# Patient Record
Sex: MALE | Race: WHITE | URBAN - METROPOLITAN AREA
[De-identification: names, ages, dates, MRNs, and addresses within clinical notes are randomized per-mention and may not be internally consistent; named-entity substitution may affect disease eponyms.]

---

## 2019-09-17 ENCOUNTER — HOSPITAL ENCOUNTER (OUTPATIENT)
Dept: PHYSICAL THERAPY | Age: 39
Discharge: HOME | End: 2019-09-17
Payer: SELF-PAY

## 2019-09-17 PROCEDURE — 97161 PT EVAL LOW COMPLEX 20 MIN: CPT

## 2019-09-17 NOTE — THERAPY EVALUATION
Ekaterina Sommers  : 1980  Primary: Lukas Hayes Self Pay  Secondary:  [address]  at [address]  [phone]   [phone]          OUTPATIENT PHYSICAL THERAPY:Initial Assessment 2019   ICD-10: Treatment Diagnosis: Pain in joint, left elbow, M25.522  Stiffness in joint, not elsewhere classified, left elbow, M25.622  Precautions/Allergies:   Patient has no allergy information on record. TREATMENT PLAN:  Effective Dates: 2019 TO 2019 (90 days). Frequency/Duration: 2 times a week for 2 weeks then one time a week for 10 weeks MEDICAL/REFERRING DIAGNOSIS:  Left elbow pain [M25.522]   DATE OF ONSET: Surgery 19, injury on 19  REFERRING PHYSICIAN: Abbey Núñez,*  MD Orders: Evaluate and Treat  Return MD Appointment: 10/15/19     INITIAL ASSESSMENT:  Mr. Kenny Garduno presents s/p left triceps tendon repair at elbow with surgery on 19. Patient shows good healing at this time with his incision. He shows limitations per tendon healing and ROM restrictions for healing. He shows muscle atrophy as well limiting his functional abilities. He is a good candidate for skilled PT in order to address listed impairments affecting his function. Gaurav Billy, PT, DPT, OCS      PROBLEM LIST (Impacting functional limitations):  1. Decreased Strength  2. Decreased ADL/Functional Activities  3. Increased Pain  4. Decreased Activity Tolerance  5. Decreased Flexibility/Joint Mobility  6. Edema/Girth INTERVENTIONS PLANNED: (Treatment may consist of any combination of the following)  1. Decongestion Therapy  2. Electrical Stimulation  3. Manual Therapy  4. Neuromuscular Re-education/Strengthening  5. Range of Motion (ROM)  6. Therapeutic Activites  7. Therapeutic Exercise/Strengthening     GOALS: (Goals have been discussed and agreed upon with patient.)  Short-Term Functional Goals: Time Frame: 4 weeks  1.  Patient will show a greater than 10 degree increase in left elbow flexion in order to return to function  2. Patient will show full elbow extension in order to progress function  3. Patient will be independent in ROM activities for his left elbow, shoulder and wrist  Discharge Goals: Time Frame: 12 weeks  1. Patient will report returning to weight training at least 4 days a week with light weight and no pain  2. Patient will show a greater than 15 point decrease on the DASH in order to show an increase in function  3. Patient will report lifting his children up with both arms without pain  4. Patient will be independent in all strengthening activities    OUTCOME MEASURE:   Tool Used: Disabilities of the Arm, Shoulder and Hand (DASH) Questionnaire - Quick Version  Score:  Initial: 31/55  Most Recent: X/55 (Date: -- )   Interpretation of Score: The DASH is designed to measure the activities of daily living in person's with upper extremity dysfunction or pain. Each section is scored on a 1-5 scale, 5 representing the greatest disability. The scores of each section are added together for a total score of 55. MEDICAL NECESSITY:   · Patient is expected to demonstrate progress in strength, range of motion, coordination and functional technique to improve functional mobility. · Patient demonstrates excellent rehab potential due to higher previous functional level. · Skilled intervention continues to be required due to increased stiffness and tissue healing. REASON FOR SERVICES/OTHER COMMENTS:  · Patient continues to require skilled intervention due to decreased function. Total Duration:  PT Patient Time In/Time Out  Time In: 1100  Time Out: 1158    Rehabilitation Potential For Stated Goals: Excellent  Regarding Albert Perez therapy, I certify that the treatment plan above will be carried out by a therapist or under their direction.   Thank you for this referral,  Temo Dhaliwal PT     Referring Physician Signature: Seldon Bamberger Martha Cool,* No Signature is Required for this note. PAIN/SUBJECTIVE:   Initial: Pain Intensity 1: 1  Post Session:  0/10   HISTORY:   History of Injury/Illness (Reason for Referral):  Patient reports he was lifting weights for his workout of cleans and felt a pop in his left elbow. He had increased bruising and swelling at that time, but elected to see what recovery would do if he just gave it time. He gained a lot of function back, just not the strength. He elected to have a tendon repair surgery on 9/6/19 for the long head of the triceps in the left arm. Incision at posterior elbow. His goals are to get back to normal function and his lifting for exercise. 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 Past Medical History/Comorbidities:   Mr. Krissy Escamilla  has a past medical history of Heart abnormalities. He also has no past medical history of Arthritis, Asthma, Autoimmune disease ([address] Cancer ([address] Chronic kidney disease, Chronic pain, COPD, Diabetes ([address] GERD (gastroesophageal reflux disease), Hypertension, Liver disease, Musculoskeletal disorder, Neurological disorder, Osteoporosis, Other ill-defined conditions, Psychiatric disorder, PUD (peptic ulcer disease), Seizures ([address] Stroke ([address] Thromboembolus ([address] or Thyroid disease. Mr. Krissy Escamilla  has no past surgical history on file.   Social History/Living Environment:       Social History     Socioeconomic History    Marital status:      Spouse name: Not on file    Number of children: Not on file    Years of education: Not on file    Highest education level: Not on file   Occupational History    Not on file   Social Needs    Financial resource strain: Not on file    Food insecurity:     Worry: Not on file     Inability: Not on file    Transportation needs:     Medical: Not on file     Non-medical: Not on file   Tobacco Use    Smoking status: Not on file   Substance and Sexual Activity    Alcohol use: Not on file    Drug use: Not on file    Sexual activity: Not on file   Lifestyle    Physical activity:     Days per week: Not on file     Minutes per session: Not on file    Stress: Not on file   Relationships    Social connections:     Talks on phone: Not on file     Gets together: Not on file     Attends Anabaptist service: Not on file     Active member of club or organization: Not on file     Attends meetings of clubs or organizations: Not on file     Relationship status: Not on file    Intimate partner violence:     Fear of current or ex partner: Not on file     Emotionally abused: Not on file     Physically abused: Not on file     Forced sexual activity: Not on file   Other Topics Concern    Caffeine Concern Not Asked    Back Care Not Asked    Exercise Not Asked    Occupational Exposure Not Asked    Sleep Concern Not Asked    Stress Concern Not Asked    Weight Concern Not Asked   Social History Narrative    Not on file       Prior Level of Function/Work/Activity:  Independent, gym   Dominant Side:         RIGHT   Ambulatory/Rehab Services H2 Model Falls Risk Assessment   Risk Factors:       (1)  Gender [Male] Ability to Rise from Chair:       (0)  Ability to rise in a single movement   Falls Prevention Plan:       No modifications necessary   Total: (5 or greater = High Risk): 1   ©2010 [facility] [address] States Patent #[id number]. Federal Law prohibits the replication, distribution or use without written permission from [facility] [employer]   Current Medications: over the counter as needed for pain    No current outpatient medications on file. Date Last Reviewed:  9/17/2019     Number of Personal Factors/Comorbidities that affect the Plan of Care: 0: LOW COMPLEXITY   EXAMINATION:   Observation/Orthostatic Postural Assessment:          Patient shows good healing in the scar with steri strips still intact.   Some swelling noted on posterior left elbow  Girth: Right elbow: 34 cm (10 cm prox to joint line), 29 cm (joint line), 28 cm (10 cm distal to joint line)  Left elbow:32 cm (10 cm prox to joint line), 30 cm (joint line), 29 cm (10 cm distal to joint line)  Noted atrophy at biceps and triceps bulk  Palpation:          Tightness in left triceps tendons and muscle belly  Edema and warmth around olecranon process  Restricted at full extension with swelling and tracking of ulna  Radius shows some restriction with supination  Tendon tension limited with elbow flexion due to surgical healing  ROM:           L Elbow: missing 2 deg from full extension to 117 deg of flexion at first barrier  R Elbow: 0-132 deg  Strength:          Limitations per surgery at this time. Body Structures Involved:  1. Bones  2. Joints  3. Muscles  4. Ligaments Body Functions Affected:  1. Neuromusculoskeletal  2. Movement Related Activities and Participation Affected:  1. General Tasks and Demands  2. Mobility  3. Domestic Life  4.  Community, Social and [place] [employer]   Number of elements (examined above) that affect the Plan of Care: 4+: HIGH COMPLEXITY   CLINICAL PRESENTATION:   Presentation: Stable and uncomplicated: LOW COMPLEXITY   CLINICAL DECISION MAKING:   Use of outcome tool(s) and clinical judgement create a POC that gives a: Clear prediction of patient's progress: LOW COMPLEXITY

## 2019-09-17 NOTE — PROGRESS NOTES
Savanah Call  : 1980  Primary: Carmen Simple Self Pay  Secondary:  [address]  at [address]  [phone]   [phone]        OUTPATIENT PHYSICAL THERAPY: Daily Treatment Note 2019  Visit Count:  1    ICD-10: Treatment Diagnosis: Pain in joint, left elbow, M25.522  Stiffness in joint, not elsewhere classified, left elbow, M25.622  Precautions/Allergies:   Patient has no allergy information on record. TREATMENT PLAN:  Effective Dates: 2019 TO 2019 (90 days). Frequency/Duration: 2 times a week for 2 weeks then one time a week for 10 weeks    Pre-treatment Symptoms/Complaints:  Patient reports no real pain since surgery and he is taking it really easy  Pain: Initial: Pain Intensity 1: 1 /10 Post Session:  0/10   Medications Last Reviewed:  2019  Updated Objective Findings:  See evaluation note from today  TREATMENT:     MANUAL THERAPY: (0 minutes): Joint mobilization and Soft tissue mobilization was utilized and necessary because of the patient's restricted joint motion and restricted motion of soft tissue. [employer] Portal  Treatment/Session Summary:    · Response to Treatment:  Patient understands HEP and POC at this time. · Communication/Consultation:  None today  · Equipment provided today:  None today  · Recommendations/Intent for next treatment session: Next visit will focus on ROM.     Total Treatment Billable Duration:  0 minutes  PT Patient Time In/Time Out  Time In: 1100  Time Out: [address] Ter[address] Ramos, PT    Future Appointments   Date Time Provider Dot Simmons   2019 11:00 AM Garry Spears, PT [facility]   2019 11:00 AM Dorothy Jimenes, PT [facility]   2019 11:00 AM Dorothy Jimenes, PT [facility]   10/1/2019 11:00 AM Dorothy Jimenes, PT [facility]   10/3/2019 11:00 AM Dorothy Jimenes, PT [facility]   10/8/2019 11:00 AM Dorothy Jimenes, PT [facility]   10/10/2019 11:00 AM [facility]

## 2019-09-19 ENCOUNTER — HOSPITAL ENCOUNTER (OUTPATIENT)
Dept: PHYSICAL THERAPY | Age: 39
Discharge: HOME | End: 2019-09-19
Payer: SELF-PAY

## 2019-09-19 PROCEDURE — 97140 MANUAL THERAPY 1/> REGIONS: CPT

## 2019-09-19 PROCEDURE — 97110 THERAPEUTIC EXERCISES: CPT

## 2019-09-19 NOTE — PROGRESS NOTES
Hemal Padilla  : 1980  Primary: Lisbeth Camacho Self Pay  Secondary:  [address]  at [address]  [phone]   [phone]        OUTPATIENT PHYSICAL THERAPY: Daily Treatment Note 2019  Visit Count:  2    ICD-10: Treatment Diagnosis: Pain in joint, left elbow, M25.522  Stiffness in joint, not elsewhere classified, left elbow, M25.622  Precautions/Allergies:   Patient has no allergy information on record. TREATMENT PLAN:  Effective Dates: 2019 TO 2019 (90 days). Frequency/Duration: 2 times a week for 2 weeks then one time a week for 10 weeks    Pre-treatment Symptoms/Complaints:  Patient reports a little stiff after working the ROM, but not bad. He is trying to not do too much  With it still  Pain: Initial: Pain Intensity 1: 1 /10 Post Session:  0/10   Medications Last Reviewed:  2019  Updated Objective Findings:  See evaluation note from today  TREATMENT:     THERAPEUTIC EXERCISE: (15 minutes):  Exercises per grid below to improve mobility, strength and coordination. Required minimal visual, verbal and manual cues to promote proper body alignment, promote proper body posture and promote proper body mechanics. Progressed resistance and range as indicated.    Date:  2019     Activity/Exercise Parameters   ROM Passive x 10 each for flexion and extension  Wrist supination and pronation x 15 each   Scar tissue mobilization With dysom perpendicular to the scar x 5 min with training   Isometric Elbow in neutral with gentle activation x 5 min                       MANUAL THERAPY: (30 minutes): Joint mobilization and Soft tissue mobilization was utilized and necessary because of the patient's restricted joint motion and restricted motion of soft tissue.   -Supine soft tissue work around the left elbow, along triceps tendons and along bony contours  -Gentle mobilization of olecranon into fossa for extension  Scar tissue mobility   -Elbow flexion and extension with pronation and supination of the wrist  [employer] Portal  Treatment/Session Summary:    · Response to Treatment: Patient shows some improvement in mobility. Still warm to touch and instructed to ice after his workout  · Communication/Consultation:  None today  · Equipment provided today:  None today  · Recommendations/Intent for next treatment session: Next visit will focus on ROM.     Total Treatment Billable Duration:  45 minutes  PT Patient Time In/Time Out  Time In: 1100  Time Out: [address], PT    Future Appointments   Date Time Provider Dot Simmons   9/24/2019 11:00 AM Mary Marquez, PT [facility]   9/27/2019 11:00 AM SusansEugeniaYana Manual, PT [facility]   10/1/2019 11:00 AM SusansEugeniaYana Manual, PT [facility]   10/3/2019 11:00 AM Susans Yana Manual, PT [facility]   10/8/2019 11:00 AM Susans Yana Manual, PT [facility]   10/10/2019 11:00 AM Susans Yana Manual, PT [facility]

## 2019-09-24 ENCOUNTER — HOSPITAL ENCOUNTER (OUTPATIENT)
Dept: PHYSICAL THERAPY | Age: 39
Discharge: HOME | End: 2019-09-24
Payer: SELF-PAY

## 2019-09-24 PROCEDURE — 97140 MANUAL THERAPY 1/> REGIONS: CPT

## 2019-09-24 PROCEDURE — 97110 THERAPEUTIC EXERCISES: CPT

## 2019-09-24 NOTE — PROGRESS NOTES
Keshav Green  : 1980  Primary: Bruce Pearson Self Pay  Secondary:  [address]  at [address]  [phone]   [phone]        OUTPATIENT PHYSICAL THERAPY: Daily Treatment Note 2019  Visit Count:  3    ICD-10: Treatment Diagnosis: Pain in joint, left elbow, M25.522  Stiffness in joint, not elsewhere classified, left elbow, M25.622  Precautions/Allergies:   Patient has no allergy information on record. TREATMENT PLAN:  Effective Dates: 2019 TO 2019 (90 days). Frequency/Duration: 2 times a week for 2 weeks then one time a week for 10 weeks    Pre-treatment Symptoms/Complaints:  Patient reports a little less soreness today  Pain: Initial: Pain Intensity 1: 1 /10 Post Session:  0/10   Medications Last Reviewed:  2019  Updated Objective Findings:  See evaluation note from today  TREATMENT:     THERAPEUTIC EXERCISE: (10 minutes):  Exercises per grid below to improve mobility, strength and coordination. Required minimal visual, verbal and manual cues to promote proper body alignment, promote proper body posture and promote proper body mechanics. Progressed resistance and range as indicated.    Date:  2019     Activity/Exercise Parameters   ROM Passive x 10 each for flexion and extension  Wrist supination and pronation x 15 each   Scar tissue mobilization With dysom perpendicular to the scar x 5 min with training   Isometric Elbow in neutral with gentle activation x 5 min                       MANUAL THERAPY: (30 minutes): Joint mobilization and Soft tissue mobilization was utilized and necessary because of the patient's restricted joint motion and restricted motion of soft tissue.   -Supine soft tissue work around the left elbow, along triceps tendons and along bony contours  -Gentle mobilization of olecranon into fossa for extension  Scar tissue mobility   -Elbow flexion and extension with pronation and supination of the wrist  [employer] Portal  Treatment/Session Summary:    · Response to Treatment: Patient shows some improvement in mobility. Continue to progress isometrics and motion  · Communication/Consultation:  None today  · Equipment provided today:  None today  · Recommendations/Intent for next treatment session: Next visit will focus on ROM.     Total Treatment Billable Duration:  40 minutes  PT Patient Time In/Time Out  Time In: 1104  Time Out: [address], PT    Future Appointments   Date Time Provider Dot Simmons   9/27/2019 11:00 AM Fabi Camejo, PT [facility]   10/1/2019 11:00 AM Christopher Jimenes, PT [facility]   10/8/2019 11:00 AM Christopher Jimenes, PT [facility]   10/17/2019 11:00 AM Christopher Jimenes, PT [facility]   10/22/2019 10:00 AM Christopher Jimenes, PT [facility]   10/29/2019 11:00 AM Christopher Jimenes, PT [facility]

## 2019-09-27 ENCOUNTER — HOSPITAL ENCOUNTER (OUTPATIENT)
Dept: PHYSICAL THERAPY | Age: 39
Discharge: HOME | End: 2019-09-27
Payer: SELF-PAY

## 2019-09-27 PROCEDURE — 97110 THERAPEUTIC EXERCISES: CPT

## 2019-09-27 PROCEDURE — 97140 MANUAL THERAPY 1/> REGIONS: CPT

## 2019-09-27 NOTE — PROGRESS NOTES
Zulay Cormier  : 1980  Primary: Ronda Lucas Self Pay  Secondary:  [address]  at [facility] [address]  [phone]   [phone]        OUTPATIENT PHYSICAL THERAPY: Daily Treatment Note 2019  Visit Count:  4    ICD-10: Treatment Diagnosis: Pain in joint, left elbow, M25.522  Stiffness in joint, not elsewhere classified, left elbow, M25.622  Precautions/Allergies:   Patient has no allergy information on record. TREATMENT PLAN:  Effective Dates: 2019 TO 2019 (90 days). 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 He reports no soreness or pain with it  Pain: Initial: Pain Intensity 1: 1 /10 Post Session:  0/10   Medications Last Reviewed:  2019  Updated Objective Findings:  See evaluation note from today  TREATMENT:     THERAPEUTIC EXERCISE: (10 minutes):  Exercises per grid below to improve mobility, strength and coordination. Required minimal visual, verbal and manual cues to promote proper body alignment, promote proper body posture and promote proper body mechanics. Progressed resistance and range as indicated.    Date:  2019     Activity/Exercise Parameters   ROM Passive x 10 each for flexion and extension  Wrist supination and pronation x 15 each   Scar tissue mobilization With dysom perpendicular to the scar x 5 min with training   Isometric Elbow in neutral with gentle activation x 5 min   Band exercises Yellow for B ER x 30  Red band for rows with no pain x 30   Wall slide With towel x 20 for forward elevation with no pain               MANUAL THERAPY: (30 minutes): Joint mobilization and Soft tissue mobilization was utilized and necessary because of the patient's restricted joint motion and restricted motion of soft tissue.   -Supine soft tissue work around the left elbow, along triceps tendons and along bony contours  -Gentle mobilization of olecranon into fossa for extension  Scar tissue mobility   -Elbow flexion and extension with pronation and supination of the wrist  [employer] Portal  Treatment/Session Summary:    · Response to Treatment: Patient shows some improvement in mobility. Work on shoulder mobility into flexion/  · Communication/Consultation:  None today  · Equipment provided today:  None today  · Recommendations/Intent for next treatment session: Next visit will focus on ROM.     Total Treatment Billable Duration:  40 minutes  PT Patient Time In/Time Out  Time In: 1102  Time Out: [facility] 951, PT    Future Appointments   Date Time Provider Dot Simmons   10/1/2019 11:00 AM Moshe Acharya, PT [facility]   10/8/2019 11:00 AM Kaity Jimenes, PT [facility]   10/17/2019 11:00 AM Kaity Jimenes, PT [facility]   10/22/2019 10:00 AM Kaity Jimenes, PT [facility]   10/29/2019 11:00 AM Kaity Jimenes, PT [facility]

## 2019-10-01 ENCOUNTER — HOSPITAL ENCOUNTER (OUTPATIENT)
Dept: PHYSICAL THERAPY | Age: 39
Discharge: HOME | End: 2019-10-01
Payer: SELF-PAY

## 2019-10-01 PROCEDURE — 97110 THERAPEUTIC EXERCISES: CPT

## 2019-10-01 PROCEDURE — 97140 MANUAL THERAPY 1/> REGIONS: CPT

## 2019-10-01 NOTE — PROGRESS NOTES
Aydin Gomez  : 1980  Primary:   Secondary:  [address] at [address]  [phone]   [phone]        OUTPATIENT PHYSICAL THERAPY: Daily Treatment Note 10/1/2019  Visit Count:  5    ICD-10: Treatment Diagnosis: Pain in joint, left elbow, M25.522  Stiffness in joint, not elsewhere classified, left elbow, M25.622  Precautions/Allergies:   Patient has no allergy information on record. TREATMENT PLAN:  Effective Dates: 2019 TO 2019 (90 days). Frequency/Duration: 2 times a week for 2 weeks then one time a week for 10 weeks    Pre-treatment Symptoms/Complaints:  Patient reports having a little irritation in the elbow after catching a drink from falling over the weekend, but feels ok today  Pain: Initial: Pain Intensity 1: 2 /10 Post Session:  0/10   Medications Last Reviewed:  10/1/2019  Updated Objective Findings:  See evaluation note from today  TREATMENT:     THERAPEUTIC EXERCISE: (14 minutes):  Exercises per grid below to improve mobility, strength and coordination. Required minimal visual, verbal and manual cues to promote proper body alignment, promote proper body posture and promote proper body mechanics. Progressed resistance and range as indicated.    Date:  10/1/2019     Activity/Exercise Parameters   ROM Passive x 10 each for flexion and extension  Wrist supination and pronation x 15 each   Scar tissue mobilization With dysom perpendicular to the scar x 5 min with training   Isometric Elbow in neutral with gentle activation x 5 min   Band exercises Yellow for B ER x 30  Red band for rows with no pain x 30   Wall slide With towel x 20 for forward elevation with no pain   Pulley's X 4 min           MANUAL THERAPY: (30 minutes): Joint mobilization and Soft tissue mobilization was utilized and necessary because of the patient's restricted joint motion and restricted motion of soft tissue.   -Supine soft tissue work around the left elbow, along triceps tendons and along bony contours  -Gentle mobilization of olecranon into fossa for extension  Scar tissue mobility   -Elbow flexion and extension with pronation and supination of the wrist  [employer] Portal  Treatment/Session Summary:    · Response to Treatment: Patient shows some improvement in mobility. Started pulley's for overall L UE mobility today  · Communication/Consultation:  None today  · Equipment provided today:  None today  · Recommendations/Intent for next treatment session: Next visit will focus on ROM.     Total Treatment Billable Duration:  44 minutes  PT Patient Time In/Time Out  Time In: 1101  Time Out: [address], PT    Future Appointments   Date Time Provider Dot Simmons   10/8/2019 11:00 AM Kathrene Simmonds, PT [facility]   10/17/2019 11:00 AM Ryanne Jimenes, PT [facility]   10/22/2019 10:00 AM Ryanne Jimenes, PT [facility]   10/29/2019 11:00 AM Ryanne Jimenes, PT [facility]

## 2019-10-03 ENCOUNTER — APPOINTMENT (OUTPATIENT)
Dept: PHYSICAL THERAPY | Age: 39
End: 2019-10-03
Payer: SELF-PAY

## 2019-10-08 ENCOUNTER — HOSPITAL ENCOUNTER (OUTPATIENT)
Dept: PHYSICAL THERAPY | Age: 39
Discharge: HOME | End: 2019-10-08
Payer: SELF-PAY

## 2019-10-08 PROCEDURE — 97140 MANUAL THERAPY 1/> REGIONS: CPT

## 2019-10-08 PROCEDURE — 97110 THERAPEUTIC EXERCISES: CPT

## 2019-10-08 NOTE — PROGRESS NOTES
America Hansons  : 1980  Primary:   Secondary:  [address] at [address]  [phone]   [phone]        OUTPATIENT PHYSICAL THERAPY: Daily Treatment Note 10/8/2019  Visit Count:  6    ICD-10: Treatment Diagnosis: Pain in joint, left elbow, M25.522  Stiffness in joint, not elsewhere classified, left elbow, M25.622  Precautions/Allergies:   Patient has no allergy information on record. TREATMENT PLAN:  Effective Dates: 2019 TO 2019 (90 days). Frequency/Duration: 2 times a week for 2 weeks then one time a week for 10 weeks    Pre-treatment Symptoms/Complaints:  Patient reports starting to move it better and less tension. He goes to the doctor for his 6 week check in next week  Pain: Initial: Pain Intensity 1: 1 /10 Post Session:  0/10   Medications Last Reviewed:  10/8/2019  Updated Objective Findings: none today  TREATMENT:     THERAPEUTIC EXERCISE: (10 minutes):  Exercises per grid below to improve mobility, strength and coordination. Required minimal visual, verbal and manual cues to promote proper body alignment, promote proper body posture and promote proper body mechanics. Progressed resistance and range as indicated.    Date:  10/8/2019     Activity/Exercise Parameters   ROM Passive x 10 each for flexion and extension  Wrist supination and pronation x 15 each   Scar tissue mobilization With dysom perpendicular to the scar x 5 min with training   Isometric Elbow in neutral with gentle activation x 1 min   Band exercises Yellow for B ER x 30  Red band for rows with no pain x 30   Wall slide With towel x 20 for forward elevation with no pain  Circles CCW/CW x 20 each   Rina's home           MANUAL THERAPY: (30 minutes): Joint mobilization and Soft tissue mobilization was utilized and necessary because of the patient's restricted joint motion and restricted motion of soft tissue.   -Supine soft tissue work around the left elbow, along triceps tendons and along bony contours  -Gentle mobilization of olecranon into fossa for extension  Scar tissue mobility   -Elbow flexion and extension with pronation and supination of the wrist  Shoulder flexion with elbow extension  MedBridge Portal  Treatment/Session Summary:    · Response to Treatment: Patient shows some improvement in mobility and decreased swelling today  · Communication/Consultation:  None today  · Equipment provided today:  None today  · Recommendations/Intent for next treatment session: Next visit will focus on ROM.     Total Treatment Billable Duration:  40 minutes  PT Patient Time In/Time Out  Time In: 1107  Time Out: [facility]  [address], PT    Future Appointments   Date Time Provider Dot Simmons   10/17/2019 11:00 AM Presley Alegria, PT [facility]   10/22/2019 10:00 AM Bella Jimenes, PT [facility]   10/29/2019 11:00 AM Bella Jimenes, PT [facility]

## 2019-10-10 ENCOUNTER — APPOINTMENT (OUTPATIENT)
Dept: PHYSICAL THERAPY | Age: 39
End: 2019-10-10
Payer: SELF-PAY

## 2019-10-17 ENCOUNTER — HOSPITAL ENCOUNTER (OUTPATIENT)
Dept: PHYSICAL THERAPY | Age: 39
Discharge: HOME | End: 2019-10-17
Payer: SELF-PAY

## 2019-10-17 PROCEDURE — 97110 THERAPEUTIC EXERCISES: CPT

## 2019-10-17 PROCEDURE — 97140 MANUAL THERAPY 1/> REGIONS: CPT

## 2019-10-17 NOTE — PROGRESS NOTES
America Rico  : 1980  Primary:   Secondary:  [address] at [address]  [phone]   [phone]        OUTPATIENT PHYSICAL THERAPY: Daily Treatment Note 10/17/2019  Visit Count:  7    ICD-10: Treatment Diagnosis: Pain in joint, left elbow, M25.522  Stiffness in joint, not elsewhere classified, left elbow, M25.622  Precautions/Allergies:   Patient has no allergy information on record. TREATMENT PLAN:  Effective Dates: 2019 TO 2019 (90 days). Frequency/Duration: 2 times a week for 2 weeks then one time a week for 10 weeks    Pre-treatment Symptoms/Complaints:  Patient reports that the doctor said he could start pushing a little now. Pain: Initial: Pain Intensity 1: 1 /10 Post Session:  0/10   Medications Last Reviewed:  10/17/2019  Updated Objective Findings: none today  TREATMENT:     THERAPEUTIC EXERCISE: (10 minutes):  Exercises per grid below to improve mobility, strength and coordination. Required minimal visual, verbal and manual cues to promote proper body alignment, promote proper body posture and promote proper body mechanics. Progressed resistance and range as indicated.    Date:  10/17/2019     Activity/Exercise Parameters   ROM Passive x 10 each for flexion and extension  Wrist supination and pronation x 15 each   Scar tissue mobilization With dysom perpendicular to the scar x 5 min with training   Isometric Elbow fully extended 5 x 1 min   Band exercises (home) Yellow for B ER x 30  Red band for rows with no pain x 30   Wall slide With towel x 20 for forward elevation with no pain  Circles CCW/CW x 20 each   Rina's home           MANUAL THERAPY: (30 minutes): Joint mobilization and Soft tissue mobilization was utilized and necessary because of the patient's restricted joint motion and restricted motion of soft tissue.   -Supine soft tissue work around the left elbow, along triceps tendons and along bony contours  -Gentle mobilization of olecranon into fossa for extension  Scar tissue mobility   -Elbow flexion and extension with pronation and supination of the wrist  Shoulder flexion with elbow extension  MedBridge Portal  Treatment/Session Summary:    · Response to Treatment: Patient shows some improvement with end range extension, but getting good activation  · Communication/Consultation:  None today  · Equipment provided today:  None today  · Recommendations/Intent for next treatment session: Next visit will focus on ROM.     Total Treatment Billable Duration:  40 minutes  PT Patient Time In/Time Out  Time In: 1100  Time Out: [address] [place], ANNA    Future Appointments   Date Time Provider Dot Simmons   10/22/2019 10:00 AM Redd Cano, PT [facility]   10/29/2019 11:00 AM Tahmina Jimenes, PT [facility]

## 2019-10-22 ENCOUNTER — HOSPITAL ENCOUNTER (OUTPATIENT)
Dept: PHYSICAL THERAPY | Age: 39
Discharge: HOME | End: 2019-10-22
Payer: SELF-PAY

## 2019-10-22 PROCEDURE — 97110 THERAPEUTIC EXERCISES: CPT

## 2019-10-22 PROCEDURE — 97140 MANUAL THERAPY 1/> REGIONS: CPT

## 2019-10-22 NOTE — PROGRESS NOTES
John Sender  : 1980  Primary:   Secondary:  [address]  at [address]  [phone]   [phone]        OUTPATIENT PHYSICAL THERAPY: Daily Treatment Note 10/22/2019  Visit Count:  8    ICD-10: Treatment Diagnosis: Pain in joint, left elbow, M25.522  Stiffness in joint, not elsewhere classified, left elbow, M25.622  Precautions/Allergies:   Patient has no allergy information on record. TREATMENT PLAN:  Effective Dates: 2019 TO 2019 (90 days). Frequency/Duration: 2 times a week for 2 weeks then one time a week for 10 weeks    Pre-treatment Symptoms/Complaints:  Patient reports that he is doing well and feels progression from last week  Pain: Initial: Pain Intensity 1: 1 /10 Post Session:  0/10   Medications Last Reviewed:  10/22/2019  Updated Objective Findings: none today  TREATMENT:     THERAPEUTIC EXERCISE: (10 minutes):  Exercises per grid below to improve mobility, strength and coordination. Required minimal visual, verbal and manual cues to promote proper body alignment, promote proper body posture and promote proper body mechanics. Progressed resistance and range as indicated.    Date:  10/22/2019     Activity/Exercise Parameters   ROM Passive x 10 each for flexion and extension  Wrist supination and pronation x 15 each   Scar tissue mobilization With dysom perpendicular to the scar x 5 min with training   Isometric Elbow fully extended 5 x 1 min   Band exercises (home) Yellow for B ER x 30  Red band for rows with no pain x 30   Wall slide With towel x 20 for forward elevation with no pain  Circles CCW/CW x 20 each   Rina's home           MANUAL THERAPY: (30 minutes): Joint mobilization and Soft tissue mobilization was utilized and necessary because of the patient's restricted joint motion and restricted motion of soft tissue.   -Supine soft tissue work around the left elbow, along triceps tendons and along bony contours  -Gentle mobilization of olecranon into fossa for extension  Scar tissue mobility   -Elbow flexion and extension with pronation and supination of the wrist  Shoulder flexion with elbow extension  MedBridge Portal  Treatment/Session Summary:    · Response to Treatment: Patient shows some improvement with end range extension, but getting good activation. Working through range now  · Communication/Consultation:  None today  · Equipment provided today:  None today  · Recommendations/Intent for next treatment session: Next visit will focus on ROM.     Total Treatment Billable Duration:  40 minutes  PT Patient Time In/Time Out  Time In: 1004  Time Out: 600 Freda Boykin, PT    Future Appointments   Date Time Provider Dot Simmons   10/29/2019 11:00 AM Cheryle Rhymes, PT [facility]

## 2019-10-31 ENCOUNTER — HOSPITAL ENCOUNTER (OUTPATIENT)
Dept: PHYSICAL THERAPY | Age: 39
Discharge: HOME | End: 2019-10-31
Payer: SELF-PAY

## 2019-10-31 PROCEDURE — 97140 MANUAL THERAPY 1/> REGIONS: CPT

## 2019-10-31 PROCEDURE — 97110 THERAPEUTIC EXERCISES: CPT

## 2019-10-31 NOTE — PROGRESS NOTES
Dimitris Estesney  : 1980  Primary:   Secondary:  [address]  at [address]  [phone]   [phone]        OUTPATIENT PHYSICAL THERAPY: Daily Treatment Note 10/31/2019  Visit Count:  9    ICD-10: Treatment Diagnosis: Pain in joint, left elbow, M25.522  Stiffness in joint, not elsewhere classified, left elbow, M25.622  Precautions/Allergies:   Patient has no allergy information on record. TREATMENT PLAN:  Effective Dates: 2019 TO 2019 (90 days). Frequency/Duration: 2 times a week for 2 weeks then one time a week for 10 weeks    Pre-treatment Symptoms/Complaints:  Patient reports that he is doing well and feels progression from last week. He is a little sore from his workout, but feels like it is progressing  Pain: Initial: Pain Intensity 1: 1 /10 Post Session:  0/10   Medications Last Reviewed:  10/31/2019  Updated Objective Findings: none today  TREATMENT:     THERAPEUTIC EXERCISE: (25 minutes):  Exercises per grid below to improve mobility, strength and coordination. Required minimal visual, verbal and manual cues to promote proper body alignment, promote proper body posture and promote proper body mechanics. Progressed resistance and range as indicated.    Date:  10/31/2019     Activity/Exercise Parameters   ROM Passive x 10 each for flexion and extension  Wrist supination and pronation x 15 each   Scar tissue mobilization With dysom perpendicular to the scar x 5 min with training   Isometric Elbow fully extended 5 x 1 min   Band exercises (home) Yellow for extension from shortened position x 5 min   Wall slide With towel x 20 for forward elevation with no pain  Circles CCW/CW x 20 each   Rina's home           MANUAL THERAPY: (15 minutes): Joint mobilization and Soft tissue mobilization was utilized and necessary because of the patient's restricted joint motion and restricted motion of soft tissue.   -Supine soft tissue work around the left elbow, along triceps tendons and along bony contours  -Gentle mobilization of olecranon into fossa for extension  Scar tissue mobility   -Elbow flexion and extension with pronation and supination of the wrist  Shoulder flexion with elbow extension  MedBridge Portal  Treatment/Session Summary:    · Response to Treatment: Patient shows some improvement with end range extension, but getting good activation. Started more band work today  · Communication/Consultation:  None today  · Equipment provided today:  None today  · Recommendations/Intent for next treatment session: Next visit will focus on ROM.     Total Treatment Billable Duration:  40 minutes  PT Patient Time In/Time Out  Time In: 1102  Time Out: [address], PT    Future Appointments   Date Time Provider Dot Simmons   11/14/2019 10:00 AM Antonina Spears, PT [facility]

## 2019-11-14 ENCOUNTER — HOSPITAL ENCOUNTER (OUTPATIENT)
Dept: PHYSICAL THERAPY | Age: 39
Discharge: HOME | End: 2019-11-14
Payer: SELF-PAY

## 2019-11-14 PROCEDURE — 97110 THERAPEUTIC EXERCISES: CPT

## 2019-11-14 PROCEDURE — 97140 MANUAL THERAPY 1/> REGIONS: CPT

## 2019-11-14 NOTE — PROGRESS NOTES
Manjeet Holbrook  : 1980  Primary:   Secondary:  [address]  at [address]  [phone]   [phone]        OUTPATIENT PHYSICAL THERAPY: Daily Treatment Note 2019  Visit Count:  10    ICD-10: Treatment Diagnosis: Pain in joint, left elbow, M25.522  Stiffness in joint, not elsewhere classified, left elbow, M25.622  Precautions/Allergies:   Patient has no allergy information on record. TREATMENT PLAN:  Effective Dates: 2019 TO 2019 (90 days). Frequency/Duration: 2 times a week for 2 weeks then one time a week for 10 weeks    Pre-treatment Symptoms/Complaints:  Patient reports that he is doing well and feels progression from last week. He feels a little pain still with isolating the triceps, but feels like he is progressing slowly  Pain: Initial: Pain Intensity 1: 1 /10 Post Session:  0/10   Medications Last Reviewed:  2019  Updated Objective Findings: none today  TREATMENT:     THERAPEUTIC EXERCISE: (15 minutes):  Exercises per grid below to improve mobility, strength and coordination. Required minimal visual, verbal and manual cues to promote proper body alignment, promote proper body posture and promote proper body mechanics. Progressed resistance and range as indicated.    Date:  2019     Activity/Exercise Parameters   ROM Passive x 10 each for flexion and extension  Wrist supination and pronation x 15 each   Scar tissue mobilization With dysom perpendicular to the scar x 5 min with training   Isometric Elbow fully extended 5 x 1 min   Band exercises (home) Yellow for extension from shortened position x 5 min   Wall slide With towel x 20 for forward elevation with no pain  Circles CCW/CW x 20 each   Rina's home           MANUAL THERAPY: (15 minutes): Joint mobilization and Soft tissue mobilization was utilized and necessary because of the patient's restricted joint motion and restricted motion of soft tissue.   -Supine soft tissue work around the left elbow, along triceps tendons and along bony contours  -Gentle mobilization of olecranon into fossa for extension  Scar tissue mobility   -Elbow flexion and extension with pronation and supination of the wrist  Shoulder flexion with elbow extension  MedBridge Portal  Treatment/Session Summary:    · Response to Treatment: Patient shows some improvement with end range extension, but getting good activation. Patient to progress strengthening more independently. Follow up in 3 weeks. · Communication/Consultation:  None today  · Equipment provided today:  None today  · Recommendations/Intent for next treatment session: Next visit will focus on ROM. Total Treatment Billable Duration:  32 minutes  PT Patient Time In/Time Out  Time In: 1002  Time Out: 3405 Jadiel Stephenson PT    No future appointments.

## 2019-11-14 NOTE — PROGRESS NOTES
Ramu De La Cruz  : 1980  Primary: Festus [employer]  Secondary:  [address]  at [address]  [phone]   [phone]          OUTPATIENT PHYSICAL THERAPY:Progress Report 2019   ICD-10: Treatment Diagnosis: Pain in joint, left elbow, M25.522  Stiffness in joint, not elsewhere classified, left elbow, M25.622  Precautions/Allergies:   Patient has no allergy information on record. TREATMENT PLAN:  Effective Dates: 2019 TO 2019 (90 days). Frequency/Duration: 2 times a week for 2 weeks then one time a week for 10 weeks MEDICAL/REFERRING DIAGNOSIS:  Left elbow pain [M25.522]   DATE OF ONSET: Surgery 19, injury on 19  REFERRING PHYSICIAN: Mike Núñez,*  MD Orders: Evaluate and Treat  Return MD Appointment: 10/15/19   Ramu De La Cruz has been seen for 10 visits from 19 to 2019 for left triceps tendon repair. Patient has performed therapeutic exercises, activities, and had manual therapy to increased strength, ROM and function. Patient has also used modalities for pain control in order to increase function. Patient has show an increase in function per the DASH with scores of 15/55. Patient has progressed well toward their goals and will benefit from continuing skilled PT in order to address their impairments. Tee Macias, PT, DPT, OCS, CFMT        INITIAL ASSESSMENT:  Mr. Judy Rowe presents s/p left triceps tendon repair at elbow with surgery on 19. Patient shows good healing at this time with his incision. He shows limitations per tendon healing and ROM restrictions for healing. He shows muscle atrophy as well limiting his functional abilities. He is a good candidate for skilled PT in order to address listed impairments affecting his function. Tee Macias, PT, DPT, OCS      PROBLEM LIST (Impacting functional limitations):  1. Decreased Strength  2.  Decreased ADL/Functional Activities  3. Increased Pain  4. Decreased Activity Tolerance  5. Decreased Flexibility/Joint Mobility  6. Edema/Girth INTERVENTIONS PLANNED: (Treatment may consist of any combination of the following)  1. Decongestion Therapy  2. Electrical Stimulation  3. Manual Therapy  4. Neuromuscular Re-education/Strengthening  5. Range of Motion (ROM)  6. Therapeutic Activites  7. Therapeutic Exercise/Strengthening     GOALS: (Goals have been discussed and agreed upon with patient.)  Short-Term Functional Goals: Time Frame: 4 weeks  1. Patient will show a greater than 10 degree increase in left elbow flexion in order to return to function (MET)  2. Patient will show full elbow extension in order to progress function (MET)  3. Patient will be independent in ROM activities for his left elbow, shoulder and wrist (MET)  Discharge Goals: Time Frame: 12 weeks  1. Patient will report returning to weight training at least 4 days a week with light weight and no pain (ongoing)  2. Patient will show a greater than 15 point decrease on the DASH in order to show an increase in function (MET)  3. Patient will report lifting his children up with both arms without pain (ongoing)  4. Patient will be independent in all strengthening activities (ongoing)    OUTCOME MEASURE:   Tool Used: Disabilities of the Arm, Shoulder and Hand (DASH) Questionnaire - Quick Version  Score:  Initial: 31/55  Most Recent: 15/55 (Date: -- )   Interpretation of Score: The DASH is designed to measure the activities of daily living in person's with upper extremity dysfunction or pain. Each section is scored on a 1-5 scale, 5 representing the greatest disability. The scores of each section are added together for a total score of 55. MEDICAL NECESSITY:   · Patient is expected to demonstrate progress in strength, range of motion, coordination and functional technique to improve functional mobility.   · Patient demonstrates excellent rehab potential due to higher previous functional level. · Skilled intervention continues to be required due to increased stiffness and tissue healing. REASON FOR SERVICES/OTHER COMMENTS:  · Patient continues to require skilled intervention due to decreased function. Total Duration:  PT Patient Time In/Time Out  Time In: 1002  Time Out: 1035    Rehabilitation Potential For Stated Goals: Excellent  Regarding Albert Perez therapy, I certify that the treatment plan above will be carried out by a therapist or under their direction. Thank you for this referral,  Taty Story, PT     Referring Physician Signature: Daily Núñez,* No Signature is Required for this note. PAIN/SUBJECTIVE:   Initial: Pain Intensity 1: 1  Post Session:  0/10   HISTORY:   History of Injury/Illness (Reason for Referral):  Patient reports he was lifting weights for his workout of cleans and felt a pop in his left elbow. He had increased bruising and swelling at that time, but elected to see what recovery would do if he just gave it time. He gained a lot of function back, just not the strength. He elected to have a tendon repair surgery on 9/6/19 for the long head of the triceps in the left arm. Incision at posterior elbow. His goals are to get back to normal function and his lifting for exercise. 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 Past Medical History/Comorbidities:   Mr. Juan A Brito  has a past medical history of Heart abnormalities. He also has no past medical history of Arthritis, Asthma, Autoimmune disease ([address] Cancer ([address] Chronic kidney disease, Chronic pain, COPD, Diabetes ([address] GERD (gastroesophageal reflux disease), Hypertension, Liver disease, Musculoskeletal disorder, Neurological disorder, Osteoporosis, Other ill-defined conditions, Psychiatric disorder, PUD (peptic ulcer disease), Seizures ([address] Stroke ([address] Thromboembolus ([address] or Thyroid disease. Mr. Juan A Brito  has no past surgical history on file.   Social History/Living Environment:       Social History     Socioeconomic History    Marital status:      Spouse name: Not on file    Number of children: Not on file    Years of education: Not on file    Highest education level: Not on file   Occupational History    Not on file   Social Needs    Financial resource strain: Not on file    Food insecurity:     Worry: Not on file     Inability: Not on file    Transportation needs:     Medical: Not on file     Non-medical: Not on file   Tobacco Use    Smoking status: Not on file   Substance and Sexual Activity    Alcohol use: Not on file    Drug use: Not on file    Sexual activity: Not on file   Lifestyle    Physical activity:     Days per week: Not on file     Minutes per session: Not on file    Stress: Not on file   Relationships    Social connections:     Talks on phone: Not on file     Gets together: Not on file     Attends Latter-day service: Not on file     Active member of club or organization: Not on file     Attends meetings of clubs or organizations: Not on file     Relationship status: Not on file    Intimate partner violence:     Fear of current or ex partner: Not on file     Emotionally abused: Not on file     Physically abused: Not on file     Forced sexual activity: Not on file   Other Topics Concern    Caffeine Concern Not Asked    Back Care Not Asked    Exercise Not Asked    Occupational Exposure Not Asked    Sleep Concern Not Asked    Stress Concern Not Asked    Weight Concern Not Asked   Social History Narrative    Not on file       Prior Level of Function/Work/Activity:  Independent, gym   Dominant Side:         RIGHT   Ambulatory/Rehab Services H2 Model Falls Risk Assessment   Risk Factors:       (1)  Gender [Male] Ability to Rise from Chair:       (0)  Ability to rise in a single movement   Falls Prevention Plan:       No modifications necessary   Total: (5 or greater = High Risk): 1   ©2010 [facility]. All Rights Reserved. [employer] Patent #[id number]. Federal Law prohibits the replication, distribution or use without written permission from [facility] [employer]   Current Medications: over the counter as needed for pain    No current outpatient medications on file. Date Last Reviewed:  11/14/2019     Number of Personal Factors/Comorbidities that affect the Plan of Care: 0: LOW COMPLEXITY   EXAMINATION:   Observation/Orthostatic Postural Assessment:          Patient shows good healing in the scar with steri strips still intact. Some swelling noted on posterior left elbow  Girth: Right elbow: 34 cm (10 cm prox to joint line), 29 cm (joint line), 28 cm (10 cm distal to joint line)  Left elbow:normalized  Palpation:          Tightness in left triceps tendons and muscle belly  Edema and warmth around olecranon process  Restricted at full extension with swelling and tracking of ulna  Radius shows some restriction with supination  Tendon tension limited with elbow flexion due to surgical healing  ROM:           L Elbow:  full extension to 130 deg of flexion at first barrier  R Elbow: 0-132 deg  Strength:          Limitations per surgery at this time. Body Structures Involved:  1. Bones  2. Joints  3. Muscles  4. Ligaments Body Functions Affected:  1. Neuromusculoskeletal  2. Movement Related Activities and Participation Affected:  1. General Tasks and Demands  2. Mobility  3. Domestic Life  4.  Community, Social and [place]   Number of elements (examined above) that affect the Plan of Care: 4+: HIGH COMPLEXITY   CLINICAL PRESENTATION:   Presentation: Stable and uncomplicated: LOW COMPLEXITY   CLINICAL DECISION MAKING:   Use of outcome tool(s) and clinical judgement create a POC that gives a: Clear prediction of patient's progress: LOW COMPLEXITY

## 2020-01-28 ENCOUNTER — HOSPITAL ENCOUNTER (OUTPATIENT)
Dept: PHYSICAL THERAPY | Age: 40
Discharge: HOME | End: 2020-01-28
Payer: SELF-PAY

## 2020-01-28 PROCEDURE — 97110 THERAPEUTIC EXERCISES: CPT

## 2020-01-28 PROCEDURE — 97140 MANUAL THERAPY 1/> REGIONS: CPT

## 2020-01-28 NOTE — PROGRESS NOTES
Kye Singletary  : 1980  Primary:   Secondary:  [address]  at [facility]  [address] [place] [address]  [phone]   [phone]        OUTPATIENT PHYSICAL THERAPY: Daily Treatment Note 2020  Visit Count:  11    ICD-10: Treatment Diagnosis: Pain in joint, left elbow, M25.522  Stiffness in joint, not elsewhere classified, left elbow, M25.622  Precautions/Allergies:   Patient has no allergy information on record. TREATMENT PLAN:  Effective Dates: 20 TO 20 (90 days). Frequency/Duration: 1 time for re-cert and discharge    Pre-treatment Symptoms/Complaints:  Patient reports that he is doing well and feels progression from last week. He feels a little pain still with isolating the triceps, but feels like he is progressing slowly  Pain: Initial: Pain Intensity 1: 1 /10 Post Session:  0/10   Medications Last Reviewed:  2020  Updated Objective Findings: none today  TREATMENT:     THERAPEUTIC EXERCISE: (25 minutes):  Exercises per grid below to improve mobility, strength and coordination. Required minimal visual, verbal and manual cues to promote proper body alignment, promote proper body posture and promote proper body mechanics. Progressed resistance and range as indicated.    Date:  2020     Activity/Exercise Parameters   Scar tissue mobilization With dysom perpendicular to the scar x 5 min with training   Isometric reviewed   Band exercises (home) blue for extension from shortened position x 5 min   Wall slide With towel x 20 for forward elevation with no pain  Circles CCW/CW x 20 each   [facility] home   Position and posture review 10 min for thoracic spine position and mobilization techniques       MANUAL THERAPY: (15 minutes): Joint mobilization and Soft tissue mobilization was utilized and necessary because of the patient's restricted joint motion and restricted motion of soft tissue.   -Supine soft tissue work around the left elbow, along triceps tendons and along bony contours  -Gentle mobilization of olecranon into fossa for extension  Scar tissue mobility   -Elbow flexion and extension with pronation and supination of the wrist  Shoulder flexion with elbow extension  Med[employer] Portal  Treatment/Session Summary:    · Response to Treatment: Patient shows good overall improvement and will continue on independently a this time. ·     Total Treatment Billable Duration:  40 minutes  PT Patient Time In/Time Out  Time In: 1005  Time Out: [address] PT    No future appointments.

## 2020-01-28 NOTE — THERAPY DISCHARGE
S Resources : 1980 Primary: [employer] Secondary:  Therapy Center at [facility] 
[address] [place] [address] [phone]   [phone] OUTPATIENT PHYSICAL THERAPY:Recertification and Discharge Summary 2020 ICD-10: Treatment Diagnosis: Pain in joint, left elbow, M25.522 Stiffness in joint, not elsewhere classified, left elbow, M25.622 Precautions/Allergies:  
Patient has no allergy information on record. TREATMENT PLAN: 
Effective Dates:20 to 20 one time for recert and discharge MEDICAL/REFERRING DIAGNOSIS: 
Left elbow pain [M25.522] DATE OF ONSET: Surgery 19, injury on 19 REFERRING PHYSICIAN: Juan Manuel Núñez,* 
MD Orders: Evaluate and Treat Return MD Appointment: 10/15/19 [facility] has been seen for 10 visits from 19 to 2020 for left triceps tendon repair. Patient has performed therapeutic exercises, activities, and had manual therapy to increased strength, ROM and function. Patient has also used modalities for pain control in order to increase function. Patient has show an increase in function per the DASH with scores of 12/55. Patient has met his goals for therapy at this time and will be discharged to continue independently Dilma Palacios, PT, DPT, OCS, CFMT INITIAL ASSESSMENT:  Mr. Tea Babin presents s/p left triceps tendon repair at elbow with surgery on 19. Patient shows good healing at this time with his incision. He shows limitations per tendon healing and ROM restrictions for healing. He shows muscle atrophy as well limiting his functional abilities. He is a good candidate for skilled PT in order to address listed impairments affecting his function. Dilma Palacios, PT, DPT, OCS PROBLEM LIST (Impacting functional limitations): 1. Decreased Strength 2. Decreased ADL/Functional Activities 3. Increased Pain 4. Decreased Activity Tolerance 5. Decreased Flexibility/Joint Mobility 6. Edema/Girth INTERVENTIONS PLANNED: (Treatment may consist of any combination of the following) 1. Decongestion Therapy 2. Electrical Stimulation 3. Manual Therapy 4. Neuromuscular Re-education/Strengthening 5. Range of Motion (ROM) 6. Therapeutic Activites 7. Therapeutic Exercise/Strengthening GOALS: (Goals have been discussed and agreed upon with patient.) Short-Term Functional Goals: Time Frame: 4 weeks 1. Patient will show a greater than 10 degree increase in left elbow flexion in order to return to function (MET) 2. Patient will show full elbow extension in order to progress function (MET) 3. Patient will be independent in ROM activities for his left elbow, shoulder and wrist (MET) Discharge Goals: Time Frame: 12 weeks 1. Patient will report returning to weight training at least 4 days a week with light weight and no pain (MET) 2. Patient will show a greater than 15 point decrease on the DASH in order to show an increase in function (MET) 3. Patient will report lifting his children up with both arms without pain (MET) 4. Patient will be independent in all strengthening activities (MET) OUTCOME MEASURE:  
Tool Used: Disabilities of the Arm, Shoulder and Hand (DASH) Questionnaire - Quick Version Score:  Initial: 31/55  Most Recent: 12/55 (Date: 1/28/20 ) Interpretation of Score: The DASH is designed to measure the activities of daily living in person's with upper extremity dysfunction or pain. Each section is scored on a 1-5 scale, 5 representing the greatest disability. The scores of each section are added together for a total score of 55. MEDICAL NECESSITY:  
· Patient is expected to demonstrate progress in strength, range of motion, coordination and functional technique to improve functional mobility. · Patient demonstrates excellent rehab potential due to higher previous functional level. · Skilled intervention continues to be required due to increased stiffness and tissue healing. REASON FOR SERVICES/OTHER COMMENTS: 
· Patient continues to require skilled intervention due to decreased function. Total Duration: PT Patient Time In/Time Out Time In: 1 Time Out: 1100 Rehabilitation Potential For Stated Goals: Excellent Regarding Albert Perez therapy, I certify that the treatment plan above will be carried out by a therapist or under their direction. Thank you for this referral, Oswaldo Escobedo, PT Referring Physician Signature: Tony Núñez,* _______________________________ Date _____________ PAIN/SUBJECTIVE:  
Initial: Pain Intensity 1: 1  Post Session:  0/10 HISTORY:  
History of Injury/Illness (Reason for Referral): 
Patient reports he was lifting weights for his workout of cleans and felt a pop in his left elbow. He had increased bruising and swelling at that time, but elected to see what recovery would do if he just gave it time. He gained a lot of function back, just not the strength. He elected to have a tendon repair surgery on 9/6/19 for the long head of the triceps in the left arm. Incision at posterior elbow. His goals are to get back to normal function and his lifting for exercise. 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 Past Medical History/Comorbidities:  
Mr. Xochitl Pitt  has a past medical history of Heart abnormalities. He also has no past medical history of Arthritis, Asthma, Autoimmune disease ([address] Cancer ([address] Chronic kidney disease, Chronic pain, COPD, Diabetes ([address] GERD (gastroesophageal reflux disease), Hypertension, Liver disease, Musculoskeletal disorder, Neurological disorder, Osteoporosis, Other ill-defined conditions, Psychiatric disorder, PUD (peptic ulcer disease), Seizures ([address] Stroke ([address] Thromboembolus ([address] or Thyroid disease. Mr. Xochitl Pitt  has no past surgical history on file. Social History/Living Environment:  
   
Social History Socioeconomic History  Marital status:  Spouse name: Not on file  Number of children: Not on file  Years of education: Not on file  Highest education level: Not on file Occupational History  Not on file Social Needs  Financial resource strain: Not on file  Food insecurity:  
  Worry: Not on file Inability: Not on file  Transportation needs:  
  Medical: Not on file Non-medical: Not on file Tobacco Use  Smoking status: Not on file Substance and Sexual Activity  Alcohol use: Not on file  Drug use: Not on file  Sexual activity: Not on file Lifestyle  Physical activity:  
  Days per week: Not on file Minutes per session: Not on file  Stress: Not on file Relationships  Social connections:  
  Talks on phone: Not on file Gets together: Not on file Attends Hindu service: Not on file Active member of club or organization: Not on file Attends meetings of clubs or organizations: Not on file Relationship status: Not on file  Intimate partner violence:  
  Fear of current or ex partner: Not on file Emotionally abused: Not on file Physically abused: Not on file Forced sexual activity: Not on file Other Topics Concern  Caffeine Concern Not Asked  Back Care Not Asked  Exercise Not Asked  Occupational Exposure Not Asked  Sleep Concern Not Asked  Stress Concern Not Asked  Weight Concern Not Asked Social History Narrative  Not on file Prior Level of Function/Work/Activity: 
Independent, gym  Dominant Side:  
      [facility] H2 Model Falls Risk Assessment Risk Factors: 
     (1)  Gender [Male] Ability to Rise from Chair: 
     (0)  Ability to rise in a single movement Falls Prevention Plan: No modifications necessary Total: (5 or greater = High Risk): 1 ©2010 [facility] [place] [address] [place] [address] Patent #[id number]. Federal Law prohibits the replication, distribution or use without written permission from [facility] of [employer] Current Medications: over the counter as needed for pain No current outpatient medications on file. Date Last Reviewed:  1/28/2020 Number of Personal Factors/Comorbidities that affect the Plan of Care: 0: LOW COMPLEXITY EXAMINATION:  
Observation/Orthostatic Postural Assessment:   
 
Left elbow:normalized Palpation:   
      Tightness in left triceps tendons and muscle belly Edema and warmth around olecranon process Restricted at full extension with swelling and tracking of ulna Radius shows some restriction with supination Tendon tension limited with elbow flexion due to surgical healing ROM:   
       L Elbow:  full extension to 132 deg of flexion at first barrier R Elbow: 0-132 deg Strength:   
      Limitations per surgery at this time. Body Structures Involved: 1. Bones 2. Joints 3. Muscles 4. Ligaments Body Functions Affected: 1. Neuromusculoskeletal 
2. Movement Related Activities and Participation Affected: 1. General Tasks and Demands 2. Mobility 3. Domestic Life 4. Community, Social and [place] Number of elements (examined above) that affect the Plan of Care: 4+: HIGH COMPLEXITY CLINICAL PRESENTATION:  
Presentation: Stable and uncomplicated: LOW COMPLEXITY CLINICAL DECISION MAKING:  
Use of outcome tool(s) and clinical judgement create a POC that gives a: Clear prediction of patient's progress: LOW COMPLEXITY

## 2024-06-11 ENCOUNTER — TELEPHONE (OUTPATIENT)
Dept: UROLOGY | Age: 44
End: 2024-06-11
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
## 2024-08-22 ENCOUNTER — OFFICE VISIT (OUTPATIENT)
Dept: UROLOGY | Age: 44
End: 2024-08-22

## 2024-08-22 DIAGNOSIS — S39.94XA INJURY TO PENIS, INITIAL ENCOUNTER: Primary | ICD-10-CM

## 2024-08-22 LAB
BILIRUBIN, URINE, POC: NEGATIVE
BLOOD URINE, POC: NEGATIVE
GLUCOSE URINE, POC: NEGATIVE
KETONES, URINE, POC: NEGATIVE
LEUKOCYTE ESTERASE, URINE, POC: NEGATIVE
NITRITE, URINE, POC: NEGATIVE
PH, URINE, POC: 6 (ref 4.6–8)
PROTEIN,URINE, POC: NEGATIVE
SPECIFIC GRAVITY, URINE, POC: 1.02 (ref 1–1.03)
URINALYSIS CLARITY, POC: NORMAL
URINALYSIS COLOR, POC: NORMAL
UROBILINOGEN, POC: NORMAL

## 2024-08-22 PROCEDURE — 99204 OFFICE O/P NEW MOD 45 MIN: CPT | Performed by: UROLOGY

## 2024-08-22 PROCEDURE — 81003 URINALYSIS AUTO W/O SCOPE: CPT | Performed by: UROLOGY

## 2024-08-22 RX ORDER — SILDENAFIL CITRATE 20 MG/1
20-40 TABLET ORAL PRN
Qty: 50 TABLET | Refills: 6 | Status: SHIPPED | OUTPATIENT
Start: 2024-08-22

## 2024-08-22 ASSESSMENT — ENCOUNTER SYMPTOMS: BACK PAIN: 0
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
## 2025-07-03 ENCOUNTER — OFFICE VISIT (OUTPATIENT)
Dept: UROLOGY | Age: 45
End: 2025-07-03

## 2025-07-03 DIAGNOSIS — S39.94XA INJURY TO PENIS, INITIAL ENCOUNTER: ICD-10-CM

## 2025-07-03 DIAGNOSIS — N52.9 ERECTILE DYSFUNCTION, UNSPECIFIED ERECTILE DYSFUNCTION TYPE: Primary | ICD-10-CM

## 2025-07-03 LAB
BILIRUBIN, URINE, POC: NEGATIVE
BLOOD URINE, POC: NEGATIVE
GLUCOSE URINE, POC: NEGATIVE MG/DL
KETONES, URINE, POC: NEGATIVE MG/DL
LEUKOCYTE ESTERASE, URINE, POC: NEGATIVE
NITRITE, URINE, POC: NEGATIVE
PH, URINE, POC: 7 (ref 4.6–8)
PROTEIN,URINE, POC: NEGATIVE MG/DL
SPECIFIC GRAVITY, URINE, POC: 1.02 (ref 1–1.03)
URINALYSIS CLARITY, POC: NORMAL
URINALYSIS COLOR, POC: NORMAL
UROBILINOGEN, POC: NORMAL MG/DL

## 2025-07-03 PROCEDURE — 81003 URINALYSIS AUTO W/O SCOPE: CPT | Performed by: UROLOGY

## 2025-07-03 PROCEDURE — 99214 OFFICE O/P EST MOD 30 MIN: CPT | Performed by: UROLOGY

## 2025-07-03 RX ORDER — SILDENAFIL CITRATE 20 MG/1
20-40 TABLET ORAL PRN
Qty: 50 TABLET | Refills: 6 | Status: SHIPPED | OUTPATIENT
Start: 2025-07-03

## 2025-07-03 ASSESSMENT — ENCOUNTER SYMPTOMS: BACK PAIN: 0
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
